# Patient Record
Sex: FEMALE | Race: WHITE
[De-identification: names, ages, dates, MRNs, and addresses within clinical notes are randomized per-mention and may not be internally consistent; named-entity substitution may affect disease eponyms.]

---

## 2017-10-31 ENCOUNTER — HOSPITAL ENCOUNTER (OUTPATIENT)
Dept: HOSPITAL 47 - LABWHC1 | Age: 57
Discharge: HOME | End: 2017-10-31
Payer: MEDICAID

## 2017-10-31 DIAGNOSIS — E03.9: ICD-10-CM

## 2017-10-31 DIAGNOSIS — I10: ICD-10-CM

## 2017-10-31 DIAGNOSIS — E78.2: ICD-10-CM

## 2017-10-31 DIAGNOSIS — E55.9: Primary | ICD-10-CM

## 2017-10-31 LAB
ALP SERPL-CCNC: 93 U/L (ref 38–126)
ALT SERPL-CCNC: 50 U/L (ref 9–52)
ANION GAP SERPL CALC-SCNC: 8 MMOL/L
AST SERPL-CCNC: 31 U/L (ref 14–36)
BASOPHILS # BLD AUTO: 0 K/UL (ref 0–0.2)
BASOPHILS NFR BLD AUTO: 1 %
BUN SERPL-SCNC: 16 MG/DL (ref 7–17)
CALCIUM SPEC-MCNC: 9.7 MG/DL (ref 8.4–10.2)
CH: 30.1
CHCM: 33.4
CHLORIDE SERPL-SCNC: 106 MMOL/L (ref 98–107)
CHOLEST SERPL-MCNC: 192 MG/DL (ref ?–200)
CO2 SERPL-SCNC: 24 MMOL/L (ref 22–30)
EOSINOPHIL # BLD AUTO: 0.1 K/UL (ref 0–0.7)
EOSINOPHIL NFR BLD AUTO: 3 %
ERYTHROCYTE [DISTWIDTH] IN BLOOD BY AUTOMATED COUNT: 4.23 M/UL (ref 3.8–5.4)
ERYTHROCYTE [DISTWIDTH] IN BLOOD: 13.5 % (ref 11.5–15.5)
GLUCOSE SERPL-MCNC: 100 MG/DL (ref 74–99)
HCT VFR BLD AUTO: 38.3 % (ref 34–46)
HDLC SERPL-MCNC: 47 MG/DL (ref 40–60)
HDW: 2.44
HGB BLD-MCNC: 12.6 GM/DL (ref 11.4–16)
LUC NFR BLD AUTO: 3 %
LYMPHOCYTES # SPEC AUTO: 1.9 K/UL (ref 1–4.8)
LYMPHOCYTES NFR SPEC AUTO: 43 %
MCH RBC QN AUTO: 29.9 PG (ref 25–35)
MCHC RBC AUTO-ENTMCNC: 33 G/DL (ref 31–37)
MCV RBC AUTO: 90.6 FL (ref 80–100)
MONOCYTES # BLD AUTO: 0.3 K/UL (ref 0–1)
MONOCYTES NFR BLD AUTO: 8 %
NEUTROPHILS # BLD AUTO: 1.8 K/UL (ref 1.3–7.7)
NEUTROPHILS NFR BLD AUTO: 42 %
NON-AFRICAN AMERICAN GFR(MDRD): >60
POTASSIUM SERPL-SCNC: 5 MMOL/L (ref 3.5–5.1)
PROT SERPL-MCNC: 7.3 G/DL (ref 6.3–8.2)
SODIUM SERPL-SCNC: 138 MMOL/L (ref 137–145)
WBC # BLD AUTO: 0.14 10*3/UL
WBC # BLD AUTO: 4.4 K/UL (ref 3.8–10.6)
WBC (PEROX): 4.39

## 2017-10-31 PROCEDURE — 36415 COLL VENOUS BLD VENIPUNCTURE: CPT

## 2017-10-31 PROCEDURE — 84443 ASSAY THYROID STIM HORMONE: CPT

## 2017-10-31 PROCEDURE — 80053 COMPREHEN METABOLIC PANEL: CPT

## 2017-10-31 PROCEDURE — 85025 COMPLETE CBC W/AUTO DIFF WBC: CPT

## 2017-10-31 PROCEDURE — 82306 VITAMIN D 25 HYDROXY: CPT

## 2017-10-31 PROCEDURE — 80061 LIPID PANEL: CPT

## 2018-05-18 ENCOUNTER — HOSPITAL ENCOUNTER (OUTPATIENT)
Dept: HOSPITAL 47 - LABWHC1 | Age: 58
End: 2018-05-18
Payer: COMMERCIAL

## 2018-05-18 DIAGNOSIS — I10: ICD-10-CM

## 2018-05-18 DIAGNOSIS — E03.9: ICD-10-CM

## 2018-05-18 DIAGNOSIS — E55.9: ICD-10-CM

## 2018-05-18 DIAGNOSIS — R73.09: Primary | ICD-10-CM

## 2018-05-18 LAB
ALBUMIN SERPL-MCNC: 4.5 G/DL (ref 3.5–5)
ALP SERPL-CCNC: 79 U/L (ref 38–126)
ALT SERPL-CCNC: 47 U/L (ref 9–52)
ANION GAP SERPL CALC-SCNC: 11 MMOL/L
AST SERPL-CCNC: 29 U/L (ref 14–36)
BUN SERPL-SCNC: 18 MG/DL (ref 7–17)
CALCIUM SPEC-MCNC: 9.9 MG/DL (ref 8.4–10.2)
CHLORIDE SERPL-SCNC: 105 MMOL/L (ref 98–107)
CHOLEST SERPL-MCNC: 210 MG/DL (ref ?–200)
CO2 SERPL-SCNC: 26 MMOL/L (ref 22–30)
ERYTHROCYTE [DISTWIDTH] IN BLOOD BY AUTOMATED COUNT: 4.18 M/UL (ref 3.8–5.4)
ERYTHROCYTE [DISTWIDTH] IN BLOOD: 12.5 % (ref 11.5–15.5)
GLUCOSE SERPL-MCNC: 104 MG/DL (ref 74–99)
HBA1C MFR BLD: 5.6 % (ref 4–6)
HCT VFR BLD AUTO: 37.1 % (ref 34–46)
HDLC SERPL-MCNC: 53 MG/DL (ref 40–60)
HGB BLD-MCNC: 13 GM/DL (ref 11.4–16)
LDLC SERPL CALC-MCNC: 131 MG/DL (ref 0–99)
MCH RBC QN AUTO: 31 PG (ref 25–35)
MCHC RBC AUTO-ENTMCNC: 35 G/DL (ref 31–37)
MCV RBC AUTO: 88.7 FL (ref 80–100)
PLATELET # BLD AUTO: 199 K/UL (ref 150–450)
POTASSIUM SERPL-SCNC: 5 MMOL/L (ref 3.5–5.1)
PROT SERPL-MCNC: 7.3 G/DL (ref 6.3–8.2)
SODIUM SERPL-SCNC: 142 MMOL/L (ref 137–145)
TRIGL SERPL-MCNC: 131 MG/DL (ref ?–150)
WBC # BLD AUTO: 4.3 K/UL (ref 3.8–10.6)

## 2018-05-18 PROCEDURE — 84443 ASSAY THYROID STIM HORMONE: CPT

## 2018-05-18 PROCEDURE — 36415 COLL VENOUS BLD VENIPUNCTURE: CPT

## 2018-05-18 PROCEDURE — 85027 COMPLETE CBC AUTOMATED: CPT

## 2018-05-18 PROCEDURE — 80053 COMPREHEN METABOLIC PANEL: CPT

## 2018-05-18 PROCEDURE — 83036 HEMOGLOBIN GLYCOSYLATED A1C: CPT

## 2018-05-18 PROCEDURE — 80061 LIPID PANEL: CPT

## 2018-05-18 PROCEDURE — 82306 VITAMIN D 25 HYDROXY: CPT

## 2020-10-13 ENCOUNTER — HOSPITAL ENCOUNTER (OUTPATIENT)
Dept: HOSPITAL 47 - ORWHC2ENDO | Age: 60
Discharge: HOME | End: 2020-10-13
Attending: SURGERY
Payer: COMMERCIAL

## 2020-10-13 VITALS — RESPIRATION RATE: 16 BRPM | TEMPERATURE: 98.9 F

## 2020-10-13 VITALS — HEART RATE: 61 BPM | SYSTOLIC BLOOD PRESSURE: 141 MMHG | DIASTOLIC BLOOD PRESSURE: 83 MMHG

## 2020-10-13 VITALS — BODY MASS INDEX: 31.6 KG/M2

## 2020-10-13 DIAGNOSIS — Z88.0: ICD-10-CM

## 2020-10-13 DIAGNOSIS — I25.10: ICD-10-CM

## 2020-10-13 DIAGNOSIS — Z79.82: ICD-10-CM

## 2020-10-13 DIAGNOSIS — Z79.890: ICD-10-CM

## 2020-10-13 DIAGNOSIS — K63.5: ICD-10-CM

## 2020-10-13 DIAGNOSIS — Z98.890: ICD-10-CM

## 2020-10-13 DIAGNOSIS — Z98.51: ICD-10-CM

## 2020-10-13 DIAGNOSIS — E07.9: ICD-10-CM

## 2020-10-13 DIAGNOSIS — Z79.899: ICD-10-CM

## 2020-10-13 DIAGNOSIS — Z88.5: ICD-10-CM

## 2020-10-13 DIAGNOSIS — I10: ICD-10-CM

## 2020-10-13 DIAGNOSIS — Z87.891: ICD-10-CM

## 2020-10-13 DIAGNOSIS — E78.5: ICD-10-CM

## 2020-10-13 DIAGNOSIS — Z12.11: Primary | ICD-10-CM

## 2020-10-13 DIAGNOSIS — Z88.6: ICD-10-CM

## 2020-10-13 DIAGNOSIS — K57.30: ICD-10-CM

## 2020-10-13 PROCEDURE — 45385 COLONOSCOPY W/LESION REMOVAL: CPT

## 2020-10-13 PROCEDURE — 88305 TISSUE EXAM BY PATHOLOGIST: CPT

## 2020-10-13 NOTE — P.PCN
Date of Procedure: 10/13/20


Procedure(s) Performed: 


PREOPERATIVE DIAGNOSIS: Colon cancer screening


POSTOPERATIVE DIAGNOSIS: Descending colon polyp 2, transverse colon polyp 3, 

diverticulosis


PROCEDURE: Colonoscopy With snare polypectomy


ANESTHESIA: MAC


SURGEON: Thomas Kennedy M.D.


SPECIMENS: Polyps


ENDOSCOPIC PROCEDURE:  The patient was placed on the endoscopy table in the left

decubitus position.  The Olympus colonoscope was inserted into the anus and 

passed under direct visualization to the base of the cecum.  The appendiceal 

orifice was visualized.  From that point the scope was slowly withdrawn 

inspecting all surfaces carefully.  There were no neoplastic inflammatory or 

polypoid lesions throughout the cecum.  In the ascending colon there was a small

polyp proximally that was removed using the snare with cautery technique and 

closer to the hepatic flexure there was a larger polyp measuring about 1.5-2 cm 

in size along a fold that was removed in a piecemeal fashion using the snare 

with cautery technique. In the transverse colon there were 3 small polyps 

removed using the snare technique.  The remainder of the transverse descending 

sigmoid and rectum are free of any neoplastic inflammatory or polypoid lesions. 

The patient had extensive diverticulosis involving the left colon.  Digital 

rectal examination was normal.  The patient was taken to the recovery room in 

stable condition per anesthesia guidelines.


RECOMMENDATIONS: Await biopsy results.  Recommend follow-up colonoscopy one year

to confirm complete excision of the distal ascending colon polyp.

## 2020-10-13 NOTE — P.GSHP
History of Present Illness


H&P Date: 10/13/20


Chief Complaint: Colon cancer screening





Patient here today for colonoscopy.  She has not had 1 previously.  No bowel 

related complaints.  No family history of colon cancer.





Past Medical History


Past Medical History: Coronary Artery Disease (CAD), Hyperlipidemia, 

Hypertension, Thyroid Disorder


Additional Past Medical History / Comment(s): pneumothorax post op shoulder sx, 

had chest tube


History of Any Multi-Drug Resistant Organisms: None Reported


Past Surgical History: Orthopedic Surgery, Tubal Ligation


Additional Past Surgical History / Comment(s): bilateral shoulder manipulation, 

and left shoulder surgery, and left knee


Past Anesthesia/Blood Transfusion Reactions: Postoperative Nausea & Vomiting 

(PONV)


Additional Past Anesthesia/Blood Transfusion Reaction / Comment(s): Grey and 

sweaty with anesthesia


Smoking Status: Former smoker





Medications and Allergies


                                Home Medications











 Medication  Instructions  Recorded  Confirmed  Type


 


Aspirin EC [Ecotrin Low Dose] 81 mg PO DAILY #90 tablet. 07/30/14 10/08/20 Rx


 


Atorvastatin [Lipitor] 80 mg PO DAILY 10/08/20 10/08/20 History


 


Levothyroxine Sodium 112 mcg PO DAILY 10/08/20 10/08/20 History


 


lisinopriL [Zestril] 5 mg PO AS DIRECTED PRN 10/08/20 10/08/20 History








                                    Allergies











Allergy/AdvReac Type Severity Reaction Status Date / Time


 


acetaminophen Allergy  Swelling Verified 10/08/20 13:10





[From Darvocet-N 100]     


 


amoxicillin [From Augmentin] Allergy  mouth/tongue Verified 10/08/20 13:10





   white rash  


 


clavulanic acid Allergy  mouth/tongue Verified 10/08/20 13:10





[From Augmentin]   white rash  


 


propoxyphene napsylate Allergy  Swelling Verified 10/08/20 13:10





[From Darvocet-N 100]     














Surgical - Exam


                                   Vital Signs











Temp Pulse Resp BP Pulse Ox


 


 98.9 F   68   16   164/77   97 


 


 10/13/20 08:09  10/13/20 08:09  10/13/20 08:09  10/13/20 08:09  10/13/20 08:09

















Physical exam:


General: Well-developed, well-nourished


HEENT: Normocephalic, sclerae nonicteric


Abdomen: Nontender, nondistended


Extremities: No edema


Neuro: Alert and oriented





Assessment and Plan


(1) Colon cancer screening


Narrative/Plan: 


Will proceed with colonoscopy at this time


Current Visit: Yes   Status: Acute   Code(s): Z12.11 - ENCOUNTER FOR SCREENING 

FOR MALIGNANT NEOPLASM OF COLON   SNOMED Code(s): 663208321

## 2021-10-01 ENCOUNTER — HOSPITAL ENCOUNTER (OUTPATIENT)
Dept: HOSPITAL 47 - LABPAT | Age: 61
Discharge: HOME | End: 2021-10-01
Attending: INTERNAL MEDICINE
Payer: COMMERCIAL

## 2021-10-01 DIAGNOSIS — Z01.812: Primary | ICD-10-CM

## 2021-10-01 DIAGNOSIS — I25.10: ICD-10-CM

## 2021-10-01 LAB
ANION GAP SERPL CALC-SCNC: 6 MMOL/L
BUN SERPL-SCNC: 20 MG/DL (ref 7–17)
CHLORIDE SERPL-SCNC: 107 MMOL/L (ref 98–107)
CO2 SERPL-SCNC: 24 MMOL/L (ref 22–30)
ERYTHROCYTE [DISTWIDTH] IN BLOOD BY AUTOMATED COUNT: 3.97 M/UL (ref 3.8–5.4)
ERYTHROCYTE [DISTWIDTH] IN BLOOD: 12.5 % (ref 11.5–15.5)
HCT VFR BLD AUTO: 34.9 % (ref 34–46)
HGB BLD-MCNC: 12.7 GM/DL (ref 11.4–16)
MCH RBC QN AUTO: 32 PG (ref 25–35)
MCHC RBC AUTO-ENTMCNC: 36.5 G/DL (ref 31–37)
MCV RBC AUTO: 87.9 FL (ref 80–100)
PLATELET # BLD AUTO: 251 K/UL (ref 150–450)
POTASSIUM SERPL-SCNC: 4.5 MMOL/L (ref 3.5–5.1)
SODIUM SERPL-SCNC: 137 MMOL/L (ref 137–145)
WBC # BLD AUTO: 7.3 K/UL (ref 3.8–10.6)

## 2021-10-01 PROCEDURE — 84520 ASSAY OF UREA NITROGEN: CPT

## 2021-10-01 PROCEDURE — 82565 ASSAY OF CREATININE: CPT

## 2021-10-01 PROCEDURE — 80051 ELECTROLYTE PANEL: CPT

## 2021-10-01 PROCEDURE — 36415 COLL VENOUS BLD VENIPUNCTURE: CPT

## 2021-10-01 PROCEDURE — 85027 COMPLETE CBC AUTOMATED: CPT

## 2021-10-07 ENCOUNTER — HOSPITAL ENCOUNTER (OUTPATIENT)
Dept: HOSPITAL 47 - CATHCVL | Age: 61
Discharge: HOME | End: 2021-10-07
Attending: INTERNAL MEDICINE
Payer: COMMERCIAL

## 2021-10-07 VITALS — RESPIRATION RATE: 18 BRPM | HEART RATE: 70 BPM | DIASTOLIC BLOOD PRESSURE: 63 MMHG | SYSTOLIC BLOOD PRESSURE: 135 MMHG

## 2021-10-07 VITALS — TEMPERATURE: 99.1 F

## 2021-10-07 DIAGNOSIS — Z82.49: ICD-10-CM

## 2021-10-07 DIAGNOSIS — Z95.5: ICD-10-CM

## 2021-10-07 DIAGNOSIS — E78.5: ICD-10-CM

## 2021-10-07 DIAGNOSIS — E78.00: ICD-10-CM

## 2021-10-07 DIAGNOSIS — Z79.82: ICD-10-CM

## 2021-10-07 DIAGNOSIS — I49.5: ICD-10-CM

## 2021-10-07 DIAGNOSIS — I47.2: ICD-10-CM

## 2021-10-07 DIAGNOSIS — I10: ICD-10-CM

## 2021-10-07 DIAGNOSIS — Z79.890: ICD-10-CM

## 2021-10-07 DIAGNOSIS — Z20.822: ICD-10-CM

## 2021-10-07 DIAGNOSIS — I25.110: Primary | ICD-10-CM

## 2021-10-07 DIAGNOSIS — Z88.0: ICD-10-CM

## 2021-10-07 DIAGNOSIS — Z79.899: ICD-10-CM

## 2021-10-07 DIAGNOSIS — I47.1: ICD-10-CM

## 2021-10-07 PROCEDURE — 87635 SARS-COV-2 COVID-19 AMP PRB: CPT

## 2021-10-07 PROCEDURE — 93458 L HRT ARTERY/VENTRICLE ANGIO: CPT

## 2021-10-07 RX ADMIN — VERAPAMIL HYDROCHLORIDE ONE ML: 2.5 INJECTION, SOLUTION INTRAVENOUS at 08:04

## 2021-10-07 RX ADMIN — VERAPAMIL HYDROCHLORIDE ONE ML: 2.5 INJECTION, SOLUTION INTRAVENOUS at 07:51

## 2021-10-07 NOTE — CC
CARDIAC CATHETERIZATION REPORT



DATE OF SERVICE:

10/07/2021



PERFORMING PHYSICIAN:

Herve Rodriges MD.



PROCEDURE PERFORMED:

1. Selective right and left coronary angiogram.

2. Left heart catheterization.



INDICATION:

This is a 61-year-old female patient with coronary artery disease and prior stenting of

the LAD and RCA who was experiencing symptoms of chest discomfort and she underwent a

stress test that came into be abnormal and because of that, a heart catheterization was

advised.



APPROACH:

Right radial artery.



COMPLICATION:

None.



LEVEL OF SEDATION:

Moderate with sedation length of 20 minutes.



PROCEDURE DESCRIPTION:

After obtaining informed consent, the patient was brought to the cardiac cath lab.  The

right radial artery was cannulated using micropuncture technique and a micropuncture

wire passed easily. Then I placed a 6-[place] sheath in the right radial artery.



2 mg of verapamil IA and 6000 units of heparin IV given.



Selective right and left coronary angiogram performed with JR4 and JL3.5 catheters.



Left heart catheterization was performed using 5-[place] pigtail catheter.



SELECTIVE CORONARY ANGIOGRAM:

1. RCA is a large caliber vessel and is a dominant vessel.  The RCA appeared to be

    stented in the proximal portion and the stent is patent.  Otherwise, the RCA has

    mild luminal irregularities only.

2. The left main is angiographically normal. It bifurcates into left circumflex and

    left anterior descending artery.

3. The left circumflex is a large caliber vessel.  It is a codominant vessel.  The

    left circumflex is angiographically normal.  It gives rise into the first and

    second and third obtuse marginal branches both appeared to be angiographically

    normal and the circumflex distally bifurcates into PDA and PLV branches.  Both

    appeared to be angiographically normal.

4. The LAD is a large caliber vessel.  The proximal LAD is angiographically normal.

    It gives rise into a large diagonal branch which seems to be angiographically

    normal.  The mid LAD is stented and the stent is patent and distal LAD has mild

    disease only.



HEMODYNAMICS:

The LVEDP was about 12-14 mmHg without significant gradient across the aortic valve.



CONCLUSION:

1. Patent stent in the right coronary artery.

2. Patent stent in the mid left anterior descending artery.

3. Mild disease involving the left circumflex coronary arteries.

4. Mildly dilated left filling pressure.



POSTPROCEDURE MANAGEMENT:

Given the above anatomy, I advised maximized medical treatment and follow up with the

patient.





MMODL / IJN: [id number] / Job#: [id number]

## 2023-06-12 ENCOUNTER — HOSPITAL ENCOUNTER (OUTPATIENT)
Dept: HOSPITAL 47 - LABWHC1 | Age: 63
Discharge: HOME | End: 2023-06-12
Attending: STUDENT IN AN ORGANIZED HEALTH CARE EDUCATION/TRAINING PROGRAM
Payer: COMMERCIAL

## 2023-06-12 DIAGNOSIS — E78.5: ICD-10-CM

## 2023-06-12 DIAGNOSIS — E03.9: ICD-10-CM

## 2023-06-12 DIAGNOSIS — E11.9: Primary | ICD-10-CM

## 2023-06-12 DIAGNOSIS — E55.9: ICD-10-CM

## 2023-06-12 LAB
ALBUMIN SERPL-MCNC: 4.5 D/DL (ref 3.8–4.9)
ALBUMIN/GLOB SERPL: 1.45 RATIO (ref 1.6–3.17)
ALP SERPL-CCNC: 116 U/L (ref 41–126)
ALT SERPL-CCNC: 47 U/L (ref 8–44)
ANION GAP SERPL CALC-SCNC: 12.3 MMOL/L (ref 4–12)
AST SERPL-CCNC: 22 U/L (ref 13–35)
BASOPHILS # BLD AUTO: 0.02 X 10*3/UL (ref 0–0.1)
BASOPHILS NFR BLD AUTO: 0.4 %
BUN SERPL-SCNC: 12.9 MG/DL (ref 9–27)
BUN/CREAT SERPL: 16.12 RATIO (ref 12–20)
CALCIUM SPEC-MCNC: 10 MG/DL (ref 8.7–10.3)
CHLORIDE SERPL-SCNC: 103 MMOL/L (ref 96–109)
CO2 SERPL-SCNC: 25.7 MMOL/L (ref 21.6–31.8)
EOSINOPHIL # BLD AUTO: 0.16 X 10*3/UL (ref 0.04–0.35)
EOSINOPHIL NFR BLD AUTO: 3 %
ERYTHROCYTE [DISTWIDTH] IN BLOOD BY AUTOMATED COUNT: 4.21 X 10*6/UL (ref 4.1–5.2)
ERYTHROCYTE [DISTWIDTH] IN BLOOD: 12 % (ref 11.5–14.5)
GLOBULIN SER CALC-MCNC: 3.1 D/DL (ref 1.6–3.3)
GLUCOSE SERPL-MCNC: 160 MG/DL (ref 70–110)
HCT VFR BLD AUTO: 38.1 % (ref 37.2–46.3)
HDLC SERPL-MCNC: 41.9 MG/DL (ref 40–60)
HGB BLD-MCNC: 12.8 D/DL (ref 12–15)
IMM GRANULOCYTES BLD QL AUTO: 0.2 %
LDLC SERPL CALC-MCNC: 242.5 MG/DL (ref 0–131)
LDLC SERPL DIRECT ASSAY-MCNC: 230 MG/DL (ref 0–129)
LYMPHOCYTES # SPEC AUTO: 2.34 X 10*3/UL (ref 0.9–5)
LYMPHOCYTES NFR SPEC AUTO: 43.3 %
MCH RBC QN AUTO: 30.4 PG (ref 27–32)
MCHC RBC AUTO-ENTMCNC: 33.6 D/DL (ref 32–37)
MCV RBC AUTO: 90.5 FL (ref 80–97)
MONOCYTES # BLD AUTO: 0.43 X 10*3/UL (ref 0.2–1)
MONOCYTES NFR BLD AUTO: 7.9 %
NEUTROPHILS # BLD AUTO: 2.45 X 10*3/UL (ref 1.8–7.7)
NEUTROPHILS NFR BLD AUTO: 45.2 %
NRBC BLD AUTO-RTO: 0 X 10*3/UL (ref 0–0.01)
PLATELET # BLD AUTO: 248 X 10*3/UL (ref 140–440)
POTASSIUM SERPL-SCNC: 5.1 MMOL/L (ref 3.5–5.5)
PROT SERPL-MCNC: 7.6 D/DL (ref 6.2–8.2)
SODIUM SERPL-SCNC: 141 MMOL/L (ref 135–145)
T4 FREE SERPL-MCNC: 2.09 NG/DL (ref 0.8–1.8)
VLDLC SERPL CALC-MCNC: 94.6 MG/DL (ref 5–40)
WBC # BLD AUTO: 5.41 X 10*3/UL (ref 4.5–10)

## 2023-06-12 PROCEDURE — 83721 ASSAY OF BLOOD LIPOPROTEIN: CPT

## 2023-06-12 PROCEDURE — 83036 HEMOGLOBIN GLYCOSYLATED A1C: CPT

## 2023-06-12 PROCEDURE — 85025 COMPLETE CBC W/AUTO DIFF WBC: CPT

## 2023-06-12 PROCEDURE — 82306 VITAMIN D 25 HYDROXY: CPT

## 2023-06-12 PROCEDURE — 84439 ASSAY OF FREE THYROXINE: CPT

## 2023-06-12 PROCEDURE — 84443 ASSAY THYROID STIM HORMONE: CPT

## 2023-06-12 PROCEDURE — 82570 ASSAY OF URINE CREATININE: CPT

## 2023-06-12 PROCEDURE — 82043 UR ALBUMIN QUANTITATIVE: CPT

## 2023-06-12 PROCEDURE — 36415 COLL VENOUS BLD VENIPUNCTURE: CPT

## 2023-06-12 PROCEDURE — 80061 LIPID PANEL: CPT

## 2023-06-12 PROCEDURE — 80053 COMPREHEN METABOLIC PANEL: CPT

## 2023-10-09 ENCOUNTER — HOSPITAL ENCOUNTER (OUTPATIENT)
Dept: HOSPITAL 47 - LABWHC1 | Age: 63
Discharge: HOME | End: 2023-10-09
Attending: INTERNAL MEDICINE
Payer: COMMERCIAL

## 2023-10-09 DIAGNOSIS — E78.5: ICD-10-CM

## 2023-10-09 DIAGNOSIS — I10: ICD-10-CM

## 2023-10-09 DIAGNOSIS — E11.9: Primary | ICD-10-CM

## 2023-10-09 LAB
ALBUMIN SERPL-MCNC: 4.5 D/DL (ref 3.8–4.9)
ALBUMIN/GLOB SERPL: 1.55 RATIO (ref 1.6–3.17)
ALP SERPL-CCNC: 96 U/L (ref 41–126)
ALT SERPL-CCNC: 26 U/L (ref 8–44)
ANION GAP SERPL CALC-SCNC: 11 MMOL/L (ref 4–12)
AST SERPL-CCNC: 18 U/L (ref 13–35)
BUN SERPL-SCNC: 11.6 MG/DL (ref 9–27)
BUN/CREAT SERPL: 14.5 RATIO (ref 12–20)
CALCIUM SPEC-MCNC: 10 MG/DL (ref 8.7–10.3)
CHLORIDE SERPL-SCNC: 104 MMOL/L (ref 96–109)
CHOLEST SERPL-MCNC: 300 MG/DL (ref 0–200)
CO2 SERPL-SCNC: 25 MMOL/L (ref 21.6–31.8)
GLOBULIN SER CALC-MCNC: 2.9 D/DL (ref 1.6–3.3)
GLUCOSE SERPL-MCNC: 122 MG/DL (ref 70–110)
HDLC SERPL-MCNC: 44.4 MG/DL (ref 40–60)
LDLC SERPL CALC-MCNC: 184.8 MG/DL (ref 0–131)
MAGNESIUM SPEC-SCNC: 2.1 MG/DL (ref 1.5–2.4)
POTASSIUM SERPL-SCNC: 4.7 MMOL/L (ref 3.5–5.5)
PROT SERPL-MCNC: 7.4 D/DL (ref 6.2–8.2)
SODIUM SERPL-SCNC: 140 MMOL/L (ref 135–145)
TRIGL SERPL-MCNC: 354 MG/DL (ref 0–149)
VLDLC SERPL CALC-MCNC: 70.8 MG/DL (ref 5–40)

## 2023-10-09 PROCEDURE — 83735 ASSAY OF MAGNESIUM: CPT

## 2023-10-09 PROCEDURE — 80053 COMPREHEN METABOLIC PANEL: CPT

## 2023-10-09 PROCEDURE — 36415 COLL VENOUS BLD VENIPUNCTURE: CPT

## 2023-10-09 PROCEDURE — 80061 LIPID PANEL: CPT

## 2023-10-09 PROCEDURE — 83036 HEMOGLOBIN GLYCOSYLATED A1C: CPT

## 2024-10-25 ENCOUNTER — HOSPITAL ENCOUNTER (OUTPATIENT)
Dept: HOSPITAL 47 - LABWHC1 | Age: 64
Discharge: HOME | End: 2024-10-25
Attending: INTERNAL MEDICINE
Payer: MEDICARE

## 2024-10-25 LAB
ALBUMIN SERPL-MCNC: 4.4 G/DL (ref 3.8–4.9)
ALBUMIN/GLOB SERPL: 1.52 RATIO (ref 1.6–3.17)
ALP SERPL-CCNC: 100 U/L (ref 41–126)
ALT SERPL-CCNC: 31 U/L (ref 8–44)
ANION GAP SERPL CALC-SCNC: 10.6 MMOL/L (ref 4–12)
AST SERPL-CCNC: 19 U/L (ref 13–35)
BASOPHILS # BLD AUTO: 0.02 X 10*3/UL (ref 0–0.1)
BASOPHILS NFR BLD AUTO: 0.3 %
BUN SERPL-SCNC: 11.3 MG/DL (ref 9–27)
BUN/CREAT SERPL: 14.12 RATIO (ref 12–20)
CALCIUM SPEC-MCNC: 9.5 MG/DL (ref 8.7–10.3)
CHLORIDE SERPL-SCNC: 104 MMOL/L (ref 96–109)
CHOLEST SERPL-MCNC: 245 MG/DL (ref 0–200)
CO2 SERPL-SCNC: 25.4 MMOL/L (ref 21.6–31.8)
EOSINOPHIL # BLD AUTO: 0.24 X 10*3/UL (ref 0.04–0.35)
EOSINOPHIL NFR BLD AUTO: 3.7 %
ERYTHROCYTE [DISTWIDTH] IN BLOOD BY AUTOMATED COUNT: 4.11 X 10*6/UL (ref 4.1–5.2)
ERYTHROCYTE [DISTWIDTH] IN BLOOD: 11.6 % (ref 11.5–14.5)
GLOBULIN SER CALC-MCNC: 2.9 G/DL (ref 1.6–3.3)
GLUCOSE SERPL-MCNC: 167 MG/DL (ref 70–110)
HCT VFR BLD AUTO: 36.5 % (ref 37.2–46.3)
HDLC SERPL-MCNC: 50.7 MG/DL (ref 40–60)
HGB BLD-MCNC: 12.5 G/DL (ref 12–15)
IMM GRANULOCYTES BLD QL AUTO: 0.3 %
LDLC SERPL CALC-MCNC: 142.5 MG/DL (ref 0–131)
LYMPHOCYTES # SPEC AUTO: 2.73 X 10*3/UL (ref 0.9–5)
LYMPHOCYTES NFR SPEC AUTO: 41.6 %
MCH RBC QN AUTO: 30.4 PG (ref 27–32)
MCHC RBC AUTO-ENTMCNC: 34.2 G/DL (ref 32–37)
MCV RBC AUTO: 88.8 FL (ref 80–97)
MONOCYTES # BLD AUTO: 0.55 X 10*3/UL (ref 0.2–1)
MONOCYTES NFR BLD AUTO: 8.4 %
NEUTROPHILS # BLD AUTO: 3 X 10*3/UL (ref 1.8–7.7)
NEUTROPHILS NFR BLD AUTO: 45.7 %
NRBC BLD AUTO-RTO: 0 X 10*3/UL (ref 0–0.01)
PLATELET # BLD AUTO: 238 X 10*3/UL (ref 140–440)
POTASSIUM SERPL-SCNC: 4.9 MMOL/L (ref 3.5–5.5)
PROT SERPL-MCNC: 7.3 G/DL (ref 6.2–8.2)
SODIUM SERPL-SCNC: 140 MMOL/L (ref 135–145)
TRIGL SERPL-MCNC: 259 MG/DL (ref 0–149)
VLDLC SERPL CALC-MCNC: 51.8 MG/DL (ref 5–40)
WBC # BLD AUTO: 6.56 X 10*3/UL (ref 4.5–10)

## 2024-10-25 PROCEDURE — 80061 LIPID PANEL: CPT

## 2024-10-25 PROCEDURE — 36415 COLL VENOUS BLD VENIPUNCTURE: CPT

## 2024-10-25 PROCEDURE — 83036 HEMOGLOBIN GLYCOSYLATED A1C: CPT

## 2024-10-25 PROCEDURE — 80053 COMPREHEN METABOLIC PANEL: CPT

## 2024-10-25 PROCEDURE — 82570 ASSAY OF URINE CREATININE: CPT

## 2024-10-25 PROCEDURE — 85025 COMPLETE CBC W/AUTO DIFF WBC: CPT

## 2024-10-25 PROCEDURE — 82043 UR ALBUMIN QUANTITATIVE: CPT
